# Patient Record
Sex: FEMALE | HISPANIC OR LATINO | URBAN - METROPOLITAN AREA
[De-identification: names, ages, dates, MRNs, and addresses within clinical notes are randomized per-mention and may not be internally consistent; named-entity substitution may affect disease eponyms.]

---

## 2017-05-11 ENCOUNTER — OFFICE VISIT (OUTPATIENT)
Dept: OPHTHALMOLOGY | Facility: CLINIC | Age: 62
End: 2017-05-11
Attending: OPHTHALMOLOGY
Payer: MEDICAID

## 2017-05-11 DIAGNOSIS — Z96.1 PSEUDOPHAKIA: Primary | ICD-10-CM

## 2017-05-11 DIAGNOSIS — H04.123 TEAR FILM INSUFFICIENCY, BILATERAL: ICD-10-CM

## 2017-05-11 DIAGNOSIS — H10.45 OTHER CHRONIC ALLERGIC CONJUNCTIVITIS: ICD-10-CM

## 2017-05-11 PROCEDURE — 92015 DETERMINE REFRACTIVE STATE: CPT | Mod: ZF

## 2017-05-11 PROCEDURE — 99213 OFFICE O/P EST LOW 20 MIN: CPT | Mod: ZF

## 2017-05-11 PROCEDURE — T1013 SIGN LANG/ORAL INTERPRETER: HCPCS | Mod: U3,ZF | Performed by: OPHTHALMOLOGY

## 2017-05-11 RX ORDER — OLOPATADINE HYDROCHLORIDE 1 MG/ML
1 SOLUTION/ DROPS OPHTHALMIC 2 TIMES DAILY
Qty: 1 BOTTLE | Refills: 11 | Status: SHIPPED | OUTPATIENT
Start: 2017-05-11 | End: 2018-11-12

## 2017-05-11 RX ORDER — CARBOXYMETHYLCELLULOSE SODIUM 5 MG/ML
1 SOLUTION/ DROPS OPHTHALMIC 4 TIMES DAILY
Qty: 1 EACH | Refills: 11 | Status: SHIPPED | OUTPATIENT
Start: 2017-05-11 | End: 2021-10-19

## 2017-05-11 ASSESSMENT — CUP TO DISC RATIO
OS_RATIO: 0.3
OD_RATIO: 0.3

## 2017-05-11 ASSESSMENT — VISUAL ACUITY
METHOD: SNELLEN - LINEAR
OS_PH_SC: 20/60-1
OD_SC: 20/25+2
OS_SC: 20/100

## 2017-05-11 ASSESSMENT — REFRACTION_MANIFEST
OD_CYLINDER: +0.75
OS_AXIS: 015
OS_ADD: +2.50
OS_SPHERE: -2.00
OD_AXIS: 105
OD_ADD: +2.50
OS_CYLINDER: +1.00
OD_SPHERE: -0.75

## 2017-05-11 ASSESSMENT — CONF VISUAL FIELD
METHOD: COUNTING FINGERS
OD_NORMAL: 1
OS_NORMAL: 1

## 2017-05-11 ASSESSMENT — TONOMETRY
OD_IOP_MMHG: 18
OS_IOP_MMHG: 15
IOP_METHOD: TONOPEN

## 2017-05-11 NOTE — PROGRESS NOTES
Jody Cooper is a 61 year old female presenting for follow up pseudophakia OU.  High myopia  S/p laser in situ keratomileusis (LASIK). Diabetes mellitus s Diabetic retinopathy  Ocular allergy uses patanol    Vision has been stable    Occasional itching    Eye History:  Pseudophakia both eyes 2014 (LE 11/12/2014, RE 10/2014)  S/p YAG left eye   S/P laser in situ keratomileusis (LASIK) both eyes     Eye Meds:  PFATs  Patanol     1) Pseudophakia, both eyes  - s/p yag left eye - doing well    2) H/o laser in situ keratomileusis (LASIK)  - stable both eyes  - tears as needed    3) h/o amblyopia LE    5) Refractive Error:  - manifest refraction updated    6) Diabetes mellitus with background Diabetic retinopathy - follows Koozluis felipe Mayen patanol  Preservative free artificial tears  Return to clinic 2 yr     ~~~~~~~~~~~~~~~~~~~~~~~~~~~~~~~~~~~~~~~~~~~~~~~~~~~~~~~~~~~~~~~~    Complete documentation of historical and exam elements from today's encounter can be found in the full encounter summary report (not reduplicated in this progress note). I personally obtained the chief complaint(s) and history of present illness.  I confirmed and edited as necessary the review of systems, past medical/surgical history, family history, social history, and examination findings as documented by others.  I examined the patient myself, and I personally reviewed the relevant tests, images, and reports as documented above. I formulated and edited as necessary the assessment and plan and discussed the findings and management plan with the patient and family.     Moy Taylor MD, MA  Director, Cornea & Anterior Segment  [facility] Department of Ophthalmology & Visual Neuroscience

## 2017-05-11 NOTE — NURSING NOTE
Chief Complaints and History of Present Illnesses   Patient presents with     Pseudophakia Follow Up     BE     HPI    Affected eye(s):  Both   Symptoms:     No decreased vision   Foreign body sensation   Dryness         Do you have eye pain now?:  No      Comments:  VA seems good BE, no changes noticed. Occ irritation BE. Using AT and patanol qd BE.     Jane RICE May 11, 2017 9:24 AM

## 2017-09-17 ENCOUNTER — HEALTH MAINTENANCE LETTER (OUTPATIENT)
Age: 62
End: 2017-09-17

## 2017-09-29 ENCOUNTER — OFFICE VISIT (OUTPATIENT)
Dept: OPHTHALMOLOGY | Facility: CLINIC | Age: 62
End: 2017-09-29
Attending: OPHTHALMOLOGY
Payer: MEDICAID

## 2017-09-29 DIAGNOSIS — E10.9 TYPE 1 DIABETES MELLITUS WITHOUT COMPLICATION (H): Primary | ICD-10-CM

## 2017-09-29 DIAGNOSIS — H10.45 OTHER CHRONIC ALLERGIC CONJUNCTIVITIS OF BOTH EYES: ICD-10-CM

## 2017-09-29 LAB
CRP SERPL-MCNC: 3.2 MG/L (ref 0–8)
ERYTHROCYTE [DISTWIDTH] IN BLOOD BY AUTOMATED COUNT: 12.3 % (ref 10–15)
ERYTHROCYTE [SEDIMENTATION RATE] IN BLOOD BY WESTERGREN METHOD: 13 MM/H (ref 0–30)
HCT VFR BLD AUTO: 41.2 % (ref 35–47)
HGB BLD-MCNC: 13.7 G/DL (ref 11.7–15.7)
MCH RBC QN AUTO: 30.9 PG (ref 26.5–33)
MCHC RBC AUTO-ENTMCNC: 33.3 G/DL (ref 31.5–36.5)
MCV RBC AUTO: 93 FL (ref 78–100)
PLATELET # BLD AUTO: 191 10E9/L (ref 150–450)
RBC # BLD AUTO: 4.44 10E12/L (ref 3.8–5.2)
WBC # BLD AUTO: 8.1 10E9/L (ref 4–11)

## 2017-09-29 PROCEDURE — 36415 COLL VENOUS BLD VENIPUNCTURE: CPT | Performed by: OPHTHALMOLOGY

## 2017-09-29 PROCEDURE — 85027 COMPLETE CBC AUTOMATED: CPT | Performed by: OPHTHALMOLOGY

## 2017-09-29 PROCEDURE — 99213 OFFICE O/P EST LOW 20 MIN: CPT | Mod: ZF

## 2017-09-29 PROCEDURE — 86140 C-REACTIVE PROTEIN: CPT | Performed by: OPHTHALMOLOGY

## 2017-09-29 PROCEDURE — 92134 CPTRZ OPH DX IMG PST SGM RTA: CPT | Mod: ZF | Performed by: OPHTHALMOLOGY

## 2017-09-29 PROCEDURE — 85652 RBC SED RATE AUTOMATED: CPT | Performed by: OPHTHALMOLOGY

## 2017-09-29 PROCEDURE — T1013 SIGN LANG/ORAL INTERPRETER: HCPCS | Mod: U3,ZF

## 2017-09-29 RX ORDER — DIPHENHYDRAMINE HCL 25 MG
1 CAPSULE ORAL 4 TIMES DAILY PRN
Qty: 15 ML | Refills: 11 | Status: SHIPPED | OUTPATIENT
Start: 2017-09-29 | End: 2018-11-12

## 2017-09-29 RX ORDER — OLOPATADINE HYDROCHLORIDE 1 MG/ML
1 SOLUTION/ DROPS OPHTHALMIC 2 TIMES DAILY
Qty: 1 BOTTLE | Refills: 11 | Status: SHIPPED | OUTPATIENT
Start: 2017-09-29 | End: 2018-09-13

## 2017-09-29 ASSESSMENT — REFRACTION_WEARINGRX
OD_AXIS: 105
OS_CYLINDER: +1.00
OD_SPHERE: -0.75
OD_CYLINDER: +0.75
OS_AXIS: 015
OS_SPHERE: -2.00

## 2017-09-29 ASSESSMENT — VISUAL ACUITY
CORRECTION_TYPE: GLASSES
METHOD: SNELLEN - LINEAR
OS_PH_SC: 20/70
OS_PH_CC: 20/80
OS_CC: 20/100
OD_CC: 20/25

## 2017-09-29 ASSESSMENT — TONOMETRY
OD_IOP_MMHG: 19
OS_IOP_MMHG: 19
IOP_METHOD: TONOPEN

## 2017-09-29 ASSESSMENT — CUP TO DISC RATIO
OS_RATIO: 0.3
OD_RATIO: 0.3

## 2017-09-29 ASSESSMENT — CONF VISUAL FIELD
OD_NORMAL: 1
OS_NORMAL: 1
METHOD: COUNTING FINGERS

## 2017-09-29 NOTE — PROGRESS NOTES
CC - Diabetic eye exam    INTERVAL HISTORY -    no change in vision, does not like her new pair of glasses.  Has HA on LHS & occiput of head past few months, no temporal tenderness, no unintentional weight loss or fevers, some jaw pain with chewing food  Good TA pulses.    HPI -   Jody Cooper is a 60-year-old woman with h/o of diabetes and lattice.  Also h/o amblyopia OS    Diabetes mellitus II, diet controlled  Sees Dr. Carmela Lemus for DM    PAST OCULAR SURGERY  LASIK OU  ~ 2000  laser retinopexy left eye 10/2009  YAG OS 2015    RETINAL IMAGING  OCT mac 9-29-17  Right Eye  FD+, no subretinal fluid / intra-retinal fluid   Left Eye   FD+, no subretinal fluid / intra-retinal fluid , peripapillary atrophy    ASSESSMENT & PLAN  1. DMII since ~ 2013   -  Good BG control, No Diabetic retinopathy   - Continue good blood sugar/BP control   - Follow up 1 year    2. Lattice degeneration OU   - left eye s/p laser retinopexy for atrophic holes 2009   -Retinal detachment  Precautions discussed    3. Amblyopia left eye    -Observe    4. S/p laser in situ keratomileusis (LASIK) both eyes     5. ALETHEA and allergic Sx   - artificial tears and patanol Rx refilled   - may be causing the HA she has    6. Pseudophakia both eyes    - unhappy with her current bifocals, her refraction is correct,    - appears to be related to location of adds, recommended to see an  to make adjustment    7.  HA    - GCA labs OK, not likely GCA      Follow up 1 year with OCT Mac     Logan Franz MD, PhD  Vitreoretinal Surgery Fellow    ATTESTATION     Attending Physician Attestation:      Complete documentation of historical and exam elements from today's encounter can be found in the full encounter summary report (not reduplicated in this progress note).  I personally obtained the chief complaint(s) and history of present illness.  I confirmed and edited as necessary the review of systems, past medical/surgical history, family  history, social history, and examination findings as documented by others; and I examined the patient myself.  I personally reviewed the relevant tests, images, and reports as documented above.  I personally reviewed the ophthalmic test(s) associated with this encounter, agree with the interpretation(s) as documented by the resident/fellow, and have edited the corresponding report(s) as necessary.   I formulated and edited as necessary the assessment and plan and discussed the findings and management plan with the patient and family    Odessa De Jesus MD, PhD  , Vitreoretinal Surgery  Department of Ophthalmology  [facility]

## 2017-09-29 NOTE — MR AVS SNAPSHOT
After Visit Summary   2017    Jody Cooper    MRN: [id number]           Patient Information     Date Of Birth          1955        Visit Information        Provider Department      2017 10:15 AM Lindsay Palmer; Odessa De Jesus MD [facility]        Today's Diagnoses     Type 1 diabetes mellitus without complication (H)    -  1    Other chronic allergic conjunctivitis of both eyes           Follow-ups after your visit        Follow-up notes from your care team     Return in about 1 year (around 2018) for DFE, OCT Mac.      Future tests that were ordered for you today     Open Future Orders        Priority Expected Expires Ordered    OCT Retina Spectralis OU (both eyes) Routine  2019            Who to contact     Please call your clinic at [phone] to:    Ask questions about your health    Make or cancel appointments    Discuss your medicines    Learn about your test results    Speak to your doctor   If you have compliments or concerns about an experience at your clinic, or if you wish to file a complaint, please contact [facility] Patient Relations at [phone] or email us at [email or web address]         Additional Information About Your Visit        MyChart Information     [employer]D [employer]omat[employer] is an electronic gateway that provides easy, online access to your medical records. With COLOURlovers, you can request a clinic appointment, read your test results, renew a prescription or communicate with your care team.     To sign up for Pixtrt visit the website at [email or web address]   You will be asked to enter the access code listed below, as well as some personal information. Please follow the directions to create your username and password.     Your access code is: [email or web address]  Expires: 2017  6:30 AM     Your access code will  in 90 days. If you need help or a new code, please contact your  [facility] or call [phone] for assistance.        Care EveryWhere ID     This is your Care EveryWhere ID. This could be used by other organizations to access your [facility] medical records  [id number]         Blood Pressure from Last 3 Encounters:   No data found for BP    Weight from Last 3 Encounters:   No data found for Wt              We Performed the Following     CBC with platelets     CRP inflammation     Erythrocyte sedimentation rate auto     OCT Retina Spectralis OU (both eyes)          Today's Medication Changes          These changes are accurate as of: 9/29/17 12:36 PM.  If you have any questions, ask your nurse or doctor.               Start taking these medicines.        Dose/Directions    hypromellose-dextran 0.3-0.1% opthalmic solution   Used for:  Other chronic allergic conjunctivitis of both eyes   Started by:  Odessa De Jesus MD        Dose:  1 drop   Place 1 drop into both eyes 4 times daily as needed   Quantity:  15 mL   Refills:  11         These medicines have changed or have updated prescriptions.        Dose/Directions    * olopatadine 0.1 % ophthalmic solution   Commonly known as:  PATANOL   This may have changed:  Another medication with the same name was added. Make sure you understand how and when to take each.   Used for:  Other chronic allergic conjunctivitis   Changed by:  Moy Taylor MD        Dose:  1 drop   Place 1 drop into both eyes 2 times daily   Quantity:  1 Bottle   Refills:  11       * olopatadine 0.1 % ophthalmic solution   Commonly known as:  PATANOL   This may have changed:  You were already taking a medication with the same name, and this prescription was added. Make sure you understand how and when to take each.   Used for:  Other chronic allergic conjunctivitis of both eyes   Changed by:  Odessa De Jesus MD        Dose:  1 drop   Place 1 drop into both eyes 2 times daily   Quantity:  1 Bottle   Refills:   11       * Notice:  This list has 2 medication(s) that are the same as other medications prescribed for you. Read the directions carefully, and ask your doctor or other care provider to review them with you.         Where to get your medicines      These medications were sent to [id number] - [place], [place] - [address] AT [facility] [address], [facility] [address]    Hours:  24-hours Phone:  [phone]     hypromellose-dextran 0.3-0.1% opthalmic solution    olopatadine 0.1 % ophthalmic solution                Primary Care Provider Office Phone # Fax #    Maria Teresa Nguyen [phone]       [place] [facility] [address] [place] [address]        Equal Access to Services     BRO MALCOLM : Hadii aad ku hadasho Socarrol, waaxda luqadaha, qaybta kaalmada adeabelyaml, lu woodard. So [facility] [phone].    ATENCIÓN: Si habla español, tiene a vergara disposición servicios gratuitos de asistencia lingüística. Adriana al [phone].    We comply with applicable federal civil rights laws and [place] laws. We do not discriminate on the basis of race, color, national origin, age, disability, sex, sexual orientation, or gender identity.            Thank you!     Thank you for choosing EYE CLINIC  for your care. Our goal is always to provide you with excellent care. Hearing back from our patients is one way we can continue to improve our services. Please take a few minutes to complete the written survey that you may receive in the mail after your visit with us. Thank you!             Your Updated Medication List - Protect others around you: Learn how to safely use, store and throw away your medicines at www.disposemymeds.org.          This list is accurate as of: 9/29/17 12:36 PM.  Always use your most recent med list.                   Brand Name Dispense Instructions for use Diagnosis    aspirin 81 MG tablet      30 tablet    Take 1 tablet (81 mg) by mouth daily    Scleritis, unspecified       bisacodyl 5 MG EC tablet    DULCOLAX     Take 5 mg by mouth daily as needed        blood glucose lancets standard    no brand specified          blood glucose lancing device    no brand specified          carboxymethylcellulose 0.5 % Soln ophthalmic solution    carboxymethylcellulose sodium    1 each    Place 1 drop into both eyes 4 times daily    Pseudophakia, Tear film insufficiency, bilateral       ciclopirox 8 % Soln           COLYTE PO           FLEXERIL PO           hypromellose-dextran 0.3-0.1% opthalmic solution     15 mL    Place 1 drop into both eyes 4 times daily as needed    Other chronic allergic conjunctivitis of both eyes       lisinopril 2.5 MG tablet    PRINIVIL/Zestril     Take 2.5 mg by mouth daily        MYLANTA PO      Take by mouth every 6 hours as needed        nexIUM 40 MG Pack   Generic drug:  Esomeprazole Magnesium           nortriptyline 10 MG capsule    PAMELOR          * olopatadine 0.1 % ophthalmic solution    PATANOL    1 Bottle    Place 1 drop into both eyes 2 times daily    Other chronic allergic conjunctivitis       * olopatadine 0.1 % ophthalmic solution    PATANOL    1 Bottle    Place 1 drop into both eyes 2 times daily    Other chronic allergic conjunctivitis of both eyes       polyethylene glycol powder    MIRALAX/GLYCOLAX     Take 1 capful by mouth daily        simvastatin 20 MG tablet    ZOCOR     Take by mouth At Bedtime        traZODone 50 MG tablet    DESYREL     Take by mouth At Bedtime        VITAMIN D3 PO      Take by mouth daily        * Notice:  This list has 2 medication(s) that are the same as other medications prescribed for you. Read the directions carefully, and ask your doctor or other care provider to review them with you.

## 2017-09-29 NOTE — NURSING NOTE
Chief Complaints and History of Present Illnesses   Patient presents with     Diabetic Eye Exam      Lattice degeneration OU     HPI    Affected eye(s):  Both   Symptoms:     No blurred vision   No decreased vision   Redness   Dryness         Do you have eye pain now?:  Yes   Location:  OS   Pain Level:  Mild Pain (3), Mild Pain (2)      Comments:  Having a harder time reading.  Not happy with her new glasses. Positioning of the bifocal seems low.  Pt complains of eyes hurting. Has to use sunglasses all the time. The whole eye hurts OS. Feels like things are swollen. Dryness, pressure. Red at night. Pain is not bad now but starts later in the day and moves from the front of the eye all the way to the back of the head.  Pt states blood sugars have been under control.  Last A1C was 7.6  Mignon Holden COA 10:36 AM September 29, 2017

## 2018-11-12 ENCOUNTER — OFFICE VISIT (OUTPATIENT)
Dept: OPHTHALMOLOGY | Facility: CLINIC | Age: 63
End: 2018-11-12
Attending: OPHTHALMOLOGY
Payer: MEDICAID

## 2018-11-12 DIAGNOSIS — H43.812 VITREOUS DEGENERATION AND DETACHMENT OF LEFT EYE: ICD-10-CM

## 2018-11-12 DIAGNOSIS — H35.413 BILATERAL RETINAL LATTICE DEGENERATION: Primary | ICD-10-CM

## 2018-11-12 DIAGNOSIS — H10.45 OTHER CHRONIC ALLERGIC CONJUNCTIVITIS OF BOTH EYES: ICD-10-CM

## 2018-11-12 DIAGNOSIS — E10.9 TYPE 1 DIABETES MELLITUS WITHOUT COMPLICATION (H): ICD-10-CM

## 2018-11-12 PROCEDURE — 92134 CPTRZ OPH DX IMG PST SGM RTA: CPT | Mod: ZF | Performed by: OPHTHALMOLOGY

## 2018-11-12 PROCEDURE — 92015 DETERMINE REFRACTIVE STATE: CPT | Mod: ZF

## 2018-11-12 PROCEDURE — G0463 HOSPITAL OUTPT CLINIC VISIT: HCPCS | Mod: 25,ZF

## 2018-11-12 PROCEDURE — T1013 SIGN LANG/ORAL INTERPRETER: HCPCS | Mod: U3,ZP | Performed by: OPHTHALMOLOGY

## 2018-11-12 RX ORDER — OLOPATADINE HYDROCHLORIDE 1 MG/ML
1 SOLUTION/ DROPS OPHTHALMIC 2 TIMES DAILY
Qty: 1 BOTTLE | Refills: 11 | Status: SHIPPED | OUTPATIENT
Start: 2018-11-12 | End: 2019-09-17

## 2018-11-12 ASSESSMENT — REFRACTION_MANIFEST
OS_ADD: +2.50
OD_SPHERE: -0.25
OD_ADD: +2.50
OS_AXIS: 048
OS_SPHERE: -1.75
OD_CYLINDER: SPHERE
OS_CYLINDER: +1.75

## 2018-11-12 ASSESSMENT — VISUAL ACUITY
OS_CC: 20/80
OD_CC: 20/20
METHOD: SNELLEN - LINEAR
OD_CC+: -2

## 2018-11-12 ASSESSMENT — TONOMETRY
IOP_METHOD: TONOPEN
OD_IOP_MMHG: 17
OS_IOP_MMHG: 18

## 2018-11-12 ASSESSMENT — REFRACTION_WEARINGRX
OD_SPHERE: -0.50
OS_AXIS: 015
OS_CYLINDER: +1.50
OD_AXIS: 105
OS_ADD: +2.50
OD_CYLINDER: +0.75
OD_ADD: +2.50
OS_SPHERE: -2.00

## 2018-11-12 ASSESSMENT — CUP TO DISC RATIO
OS_RATIO: 0.3
OD_RATIO: 0.3

## 2018-11-12 ASSESSMENT — CONF VISUAL FIELD
OS_NORMAL: 1
OD_NORMAL: 1
METHOD: COUNTING FINGERS

## 2018-11-12 NOTE — PATIENT INSTRUCTIONS
Future Appointments  Date Time Provider Department Center   9/17/2019 9:40 AM Odessa De Jesus MD [facility] CLIN

## 2018-11-12 NOTE — NURSING NOTE
Chief Complaints and History of Present Illnesses   Patient presents with     Follow Up For     Type 1 diabetes mellitus without complication (H)     HPI    Last Eye Exam:  9/29/17   Affected eye(s):  Both   Symptoms:        Unknown duration    Frequency:  Constant       Do you have eye pain now?:  No      Comments:  Jody is here for a follow up of Type 1 diabetes mellitus without complication (H)  She says her eyes are dry and itches. She says when she used Pataday, that helped the itch. She   Would like a prescription for Pataday and Refresh. She can use her medical for the Refresh if it is a prescription.   She would like a new glasses Rx too.    No A1C  BS today was 137    Price Talavera COT 10:21 AM November 12, 2018

## 2018-11-12 NOTE — PROGRESS NOTES
CC - Diabetic eye exam    INTERVAL HISTORY -   VA stable, last A1c 6.6 ~8/2018.    HPI -   Jody Cooper is a 63-year-old woman with h/o of diabetes and lattice.  Also h/o amblyopia OS    Diabetes mellitus II, diet controlled  Sees Dr. Carmela Lemus for DM    PAST OCULAR SURGERY  LASIK OU  ~ 2000  laser retinopexy left eye 10/2009  YAG OS 2015    RETINAL IMAGING  OCT mac 11-12-18  OD - retina normal, PVD, stable  OS - mild outer atrophy,   peripapillary atrophy    ASSESSMENT & PLAN  1. DMII since ~ 2013   -  Good BG control, No Diabetic retinopathy   - Continue good blood sugar/BP control   - Follow up 1 year    2. Lattice degeneration OU   - left eye s/p laser retinopexy for atrophic holes 2009   -Retinal detachment  Precautions discussed    3. Amblyopia left eye    -Observe    4. S/p laser in situ keratomileusis (LASIK) both eyes     5. ALETHEA and allergic Sx   - artificial tears and patanol Rx refilled        7.  HA    - GCA labs OK, not likely GCA      Follow up 1 year with OCT Mac     ATTESTATION     Attending Physician Attestation:      Complete documentation of historical and exam elements from today's encounter can be found in the full encounter summary report (not reduplicated in this progress note).  I personally obtained the chief complaint(s) and history of present illness.  I confirmed and edited as necessary the review of systems, past medical/surgical history, family history, social history, and examination findings as documented by others; and I examined the patient myself.  I personally reviewed the relevant tests, images, and reports as documented above.  I formulated and edited as necessary the assessment and plan and discussed the findings and management plan with the patient and family    Odessa De Jesus MD, PhD  , Vitreoretinal Surgery  Department of Ophthalmology  [facility]

## 2018-11-12 NOTE — MR AVS SNAPSHOT
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      Your next 10 appointments already scheduled     Sep 17, 2019  9:40 AM CDT   RETURN RETINA with Odessa De Jesus MD   Eye Clinic ([facility])    [address]  [facility] [id number] [phone]              Who to contact     Please call your clinic at [phone] to:    Ask questions about your health    Make or cancel appointments    Discuss your medicines    Learn about your test results    Speak to your doctor            Additional Information About Your Visit        MyChart Information     WhenSoont is an electronic gateway that provides easy, online access to your medical records. With [employer], you can request a clinic appointment, read your test results, renew a prescription or communicate with your care team.     To sign up for WhenSoont visit the website at [email or web address]   You will be asked to enter the access code listed below, as well as some personal information. Please follow the directions to create your username and password.     Your access code is: [email or web address]  Expires: 1/27/2019  5:31 AM     Your  access code will  in 90 days. If you need help or a new code, please contact your [facility] or call [phone] for assistance.        Care EveryWhere ID     This is your Care EveryWhere ID. This could be used by other organizations to access your [facility] medical records  [id number]         Blood Pressure from Last 3 Encounters:   No data found for BP    Weight from Last 3 Encounters:   No data found for Wt              We Performed the Following     OCT Retina Spectralis OU (both eyes)          Today's Medication Changes          These changes are accurate as of 18 12:20 PM.  If you have any questions, ask your nurse or doctor.               These medicines have changed or have updated prescriptions.        Dose/Directions    hypromellose-dextran 0.1-0.3 % Soln ophthalmic solution   Commonly known as:  hypromellose-dextran 0.3-0.1%   This may have changed:  reasons to take this   Used for:  Other chronic allergic conjunctivitis of both eyes        Dose:  1 drop   Place 1 drop into both eyes 4 times daily as needed for dry eyes   Quantity:  15 mL   Refills:  11            Where to get your medicines      These medications were sent to [facility] [id number] [place] [address] [address] AT [facility] [place] &   [address], [facility] [address]     Phone:  [phone]     hypromellose-dextran 0.1-0.3 % Soln ophthalmic solution    olopatadine 0.1 % ophthalmic solution                Primary Care Provider Office Phone # Fax #    Maria Teresa Nguyen [phone]       [place] [facility]ILY [facility] [address] [place] [address]        Equal Access to Services     [facility]: Hadii aad ku hadasho Soomaali, waaxda luqadaha, qaybta kaalmada adeabelyaml, lu woodard. So [facility] [phone].    ATENCIÓN: Si habla español, tiene a vergara disposición servicios gratuitos de asistencia lingüística.  Adriana cole [phone].    We comply with applicable federal civil rights laws and [place] laws. We do not discriminate on the basis of race, color, national origin, age, disability, sex, sexual orientation, or gender identity.            Thank you!     Thank you for choosing EYE CLINIC  for your care. Our goal is always to provide you with excellent care. Hearing back from our patients is one way we can continue to improve our services. Please take a few minutes to complete the written survey that you may receive in the mail after your visit with us. Thank you!             Your Updated Medication List - Protect others around you: Learn how to safely use, store and throw away your medicines at www.disposemymeds.org.          This list is accurate as of 11/12/18 12:20 PM.  Always use your most recent med list.                   Brand Name Dispense Instructions for use Diagnosis    aspirin 81 MG tablet     30 tablet    Take 1 tablet (81 mg) by mouth daily    Scleritis, unspecified       bisacodyl 5 MG EC tablet    DULCOLAX     Take 5 mg by mouth daily as needed        blood glucose lancets standard    no brand specified          blood glucose lancing device    no brand specified          carboxymethylcellulose 0.5 % Soln ophthalmic solution    carboxymethylcellulose sodium    1 each    Place 1 drop into both eyes 4 times daily    Pseudophakia, Tear film insufficiency, bilateral       ciclopirox 8 % Soln           COLYTE PO           FLEXERIL PO           hypromellose-dextran 0.1-0.3 % Soln ophthalmic solution    hypromellose-dextran 0.3-0.1%    15 mL    Place 1 drop into both eyes 4 times daily as needed for dry eyes    Other chronic allergic conjunctivitis of both eyes       lisinopril 2.5 MG tablet    PRINIVIL/Zestril     Take 2.5 mg by mouth daily        MYLANTA PO      Take by mouth every 6 hours as needed        nexIUM 40 MG Pack   Generic drug:  Esomeprazole Magnesium           nortriptyline 10 MG capsule     PAMELOR          * olopatadine 0.1 % ophthalmic solution    PATANOL    5 mL    INSTILL 1 DROP INTO BOTH EYES TWICE DAILY    Other chronic allergic conjunctivitis of both eyes       * olopatadine 0.1 % ophthalmic solution    PATANOL    1 Bottle    Place 1 drop into both eyes 2 times daily    Other chronic allergic conjunctivitis of both eyes       polyethylene glycol powder    MIRALAX/GLYCOLAX     Take 1 capful by mouth daily        simvastatin 20 MG tablet    ZOCOR     Take by mouth At Bedtime        traZODone 50 MG tablet    DESYREL     Take by mouth At Bedtime        VITAMIN D3 PO      Take by mouth daily        * Notice:  This list has 2 medication(s) that are the same as other medications prescribed for you. Read the directions carefully, and ask your doctor or other care provider to review them with you.

## 2019-09-16 DIAGNOSIS — H35.54 RPE (RETINAL PIGMENT EPITHELIUM) ATROPHY: Primary | ICD-10-CM

## 2019-09-17 ENCOUNTER — OFFICE VISIT (OUTPATIENT)
Dept: OPHTHALMOLOGY | Facility: CLINIC | Age: 64
End: 2019-09-17
Attending: OPHTHALMOLOGY
Payer: MEDICAID

## 2019-09-17 DIAGNOSIS — E10.9 TYPE 1 DIABETES MELLITUS WITHOUT COMPLICATION (H): ICD-10-CM

## 2019-09-17 DIAGNOSIS — H43.812 VITREOUS DEGENERATION AND DETACHMENT OF LEFT EYE: ICD-10-CM

## 2019-09-17 DIAGNOSIS — H35.54 RPE (RETINAL PIGMENT EPITHELIUM) ATROPHY: ICD-10-CM

## 2019-09-17 DIAGNOSIS — H10.45 OTHER CHRONIC ALLERGIC CONJUNCTIVITIS OF BOTH EYES: ICD-10-CM

## 2019-09-17 DIAGNOSIS — H35.413 BILATERAL RETINAL LATTICE DEGENERATION: Primary | ICD-10-CM

## 2019-09-17 PROCEDURE — 92134 CPTRZ OPH DX IMG PST SGM RTA: CPT | Mod: ZF | Performed by: OPHTHALMOLOGY

## 2019-09-17 PROCEDURE — G0463 HOSPITAL OUTPT CLINIC VISIT: HCPCS | Mod: ZF

## 2019-09-17 PROCEDURE — T1013 SIGN LANG/ORAL INTERPRETER: HCPCS | Mod: U3,ZF | Performed by: OPHTHALMOLOGY

## 2019-09-17 RX ORDER — OLOPATADINE HYDROCHLORIDE 1 MG/ML
1 SOLUTION/ DROPS OPHTHALMIC 2 TIMES DAILY
Qty: 1 BOTTLE | Refills: 11 | Status: SHIPPED | OUTPATIENT
Start: 2019-09-17 | End: 2020-09-25

## 2019-09-17 ASSESSMENT — CONF VISUAL FIELD
OS_NORMAL: 1
OD_NORMAL: 1
METHOD: COUNTING FINGERS

## 2019-09-17 ASSESSMENT — VISUAL ACUITY
CORRECTION_TYPE: GLASSES
OD_CC: 20/20
OS_CC: 20/60
METHOD: SNELLEN - LINEAR

## 2019-09-17 ASSESSMENT — CUP TO DISC RATIO
OS_RATIO: 0.3
OD_RATIO: 0.3

## 2019-09-17 ASSESSMENT — REFRACTION_WEARINGRX
OD_SPHERE: -0.50
OS_ADD: +2.50
OD_CYLINDER: +0.75
OD_ADD: +2.50
OS_CYLINDER: +1.50
OS_AXIS: 015
OD_AXIS: 105
OS_SPHERE: -2.00

## 2019-09-17 ASSESSMENT — TONOMETRY
IOP_METHOD: TONOPEN
OD_IOP_MMHG: 18
OS_IOP_MMHG: 17

## 2019-09-17 ASSESSMENT — EXTERNAL EXAM - LEFT EYE: OS_EXAM: NORMAL

## 2019-09-17 ASSESSMENT — EXTERNAL EXAM - RIGHT EYE: OD_EXAM: NORMAL

## 2019-09-17 NOTE — PATIENT INSTRUCTIONS
-----------------------------------------  DRY EYE INSTRUCTIONS    You have dry eyes.   Artificial tears may be helpful.  Please see the list of brands below.  You may use preserved artifical tears (in a muti-use bottle) 2-4 times per day.  Do not use preserved tears more than 4 times per day.  If you wish to use artifical tears more than 4 times per day, you should use preservative-free artifical tears.  These come in single-use vials.  You can open a new vial each day to use throughout the day, but it should be discarded at the end of the day.  Thicker tears, gels, and ointments are more effective, but they may blur your vision.  Some patients prefer to use those only at bedtime.    You may also benefit from washing your eyelashes (not your eye lids) gently using your finger once or twice per day with tap water.    You may also benefit from warm compresses once or twice per day to your eyelids.  Use a clean washcloth soaked in warm water, and place it over your eyes for 5 minutes.    Avoid medicated drops for red eyes which contain vasoconstrictors.  These should not be used for more than a few days in a row, as the medication can cause serious problems if used for too many days in a row.      Example Recommended Artificial Tear Brands:    Dry Eye Drops    Optive  Systane Ultra  TheraTears  Genteal  Refresh  Blink Tears  Soothe      Gels    Refresh Liquigel  Genteal Gel  TheraTears Gel  Celluvisc  Blink Gel  Systane Gel      Ointments    Refresh PM  Lacrilube      Contact Lens Compatible    Blink Contacts  Blink Tears  Refresh Contacts  Systane Ultra  Complete Lubricating and Rewetting  Complete Blink and Clean Lens Drops

## 2019-09-17 NOTE — PROGRESS NOTES
CC - Diabetic eye exam    INTERVAL HISTORY -   VA stable, last A1c 6.7 ~8/2019.  Has ALETHEA using artificial tears 2/day    HPI -   Jody Cooper is a 63-year-old woman with h/o of diabetes and lattice.  Also h/o amblyopia OS    Diabetes mellitus II, diet controlled  Sees Dr. Carmela Lemus for DM    PAST OCULAR SURGERY  LASIK OU  ~ 2000  laser retinopexy left eye 10/2009  YAG OS 2015    RETINAL IMAGING  OCT mac 9-17-19  OD - retina normal, PVD, stable  OS - mild outer atrophy,   peripapillary atrophy    ASSESSMENT & PLAN  1. DMII since ~ 2013   -  Good BG control, No Diabetic retinopathy   - Continue good blood sugar/BP control   - Follow up 1 year    2. Lattice degeneration OU   - left eye s/p laser retinopexy for atrophic holes 2009   -Retinal detachment  Precautions discussed    3. Amblyopia left eye    -Observe    4. S/p laser in situ keratomileusis (LASIK) both eyes     5. ALETHEA and allergic Sx   - artificial tears and patanol Rx refilled   - d/w patient PFATs        7.  HA 2017   - GCA labs OK 2017, not likely GCA      Follow up 1 year with OCT Mac     ATTESTATION     Attending Physician Attestation:      Complete documentation of historical and exam elements from today's encounter can be found in the full encounter summary report (not reduplicated in this progress note).  I personally obtained the chief complaint(s) and history of present illness.  I confirmed and edited as necessary the review of systems, past medical/surgical history, family history, social history, and examination findings as documented by others; and I examined the patient myself.  I personally reviewed the relevant tests, images, and reports as documented above.  I formulated and edited as necessary the assessment and plan and discussed the findings and management plan with the patient and family    Odessa De Jesus MD, PhD  , Vitreoretinal Surgery  Department of Ophthalmology  [facility]

## 2019-09-17 NOTE — NURSING NOTE
Chief Complaints and History of Present Illnesses   Patient presents with     Follow Up     Chief Complaint(s) and History of Present Illness(es)     Follow Up               Comments     Follow up for  diabetes and lattice degeneration.   Anjali Devlin, COA, COA 9:39 AM 09/17/2019

## 2020-09-25 DIAGNOSIS — H10.45 OTHER CHRONIC ALLERGIC CONJUNCTIVITIS OF BOTH EYES: ICD-10-CM

## 2020-09-25 RX ORDER — OLOPATADINE HYDROCHLORIDE 1 MG/ML
1 SOLUTION/ DROPS OPHTHALMIC 2 TIMES DAILY
Qty: 1 BOTTLE | Refills: 0 | Status: SHIPPED | OUTPATIENT
Start: 2020-09-25 | End: 2020-12-16

## 2020-10-06 DIAGNOSIS — H10.45 OTHER CHRONIC ALLERGIC CONJUNCTIVITIS OF BOTH EYES: ICD-10-CM

## 2020-10-06 RX ORDER — OLOPATADINE HYDROCHLORIDE 1 MG/ML
1 SOLUTION/ DROPS OPHTHALMIC 2 TIMES DAILY
Qty: 1 BOTTLE | Refills: 0 | OUTPATIENT
Start: 2020-10-06

## 2020-10-12 ENCOUNTER — OFFICE VISIT (OUTPATIENT)
Dept: OPHTHALMOLOGY | Facility: CLINIC | Age: 65
End: 2020-10-12
Attending: OPHTHALMOLOGY
Payer: MEDICAID

## 2020-10-12 DIAGNOSIS — H35.54 RPE (RETINAL PIGMENT EPITHELIUM) ATROPHY: ICD-10-CM

## 2020-10-12 DIAGNOSIS — E10.9 TYPE 1 DIABETES MELLITUS WITHOUT COMPLICATION (H): ICD-10-CM

## 2020-10-12 DIAGNOSIS — H35.413 BILATERAL RETINAL LATTICE DEGENERATION: ICD-10-CM

## 2020-10-12 DIAGNOSIS — H43.812 VITREOUS DEGENERATION AND DETACHMENT OF LEFT EYE: ICD-10-CM

## 2020-10-12 PROBLEM — E11.9 CONTROLLED TYPE 2 DIABETES MELLITUS WITHOUT COMPLICATION, WITHOUT LONG-TERM CURRENT USE OF INSULIN (H): Status: ACTIVE | Noted: 2017-09-07

## 2020-10-12 PROBLEM — F33.9 DEPRESSION, MAJOR, RECURRENT (H): Status: ACTIVE | Noted: 2017-09-07

## 2020-10-12 PROBLEM — R10.32 LEFT LOWER QUADRANT PAIN: Status: ACTIVE | Noted: 2019-07-30

## 2020-10-12 PROBLEM — K21.9 GASTROESOPHAGEAL REFLUX DISEASE WITHOUT ESOPHAGITIS: Status: ACTIVE | Noted: 2017-09-07

## 2020-10-12 PROBLEM — G47.33 OBSTRUCTIVE SLEEP APNEA ON CPAP: Status: ACTIVE | Noted: 2019-05-20

## 2020-10-12 PROCEDURE — T1013 SIGN LANG/ORAL INTERPRETER: HCPCS | Mod: U4

## 2020-10-12 PROCEDURE — 92134 CPTRZ OPH DX IMG PST SGM RTA: CPT | Performed by: OPHTHALMOLOGY

## 2020-10-12 PROCEDURE — T1013 SIGN LANG/ORAL INTERPRETER: HCPCS | Mod: U3

## 2020-10-12 PROCEDURE — 99213 OFFICE O/P EST LOW 20 MIN: CPT | Performed by: OPHTHALMOLOGY

## 2020-10-12 PROCEDURE — 92015 DETERMINE REFRACTIVE STATE: CPT

## 2020-10-12 PROCEDURE — G0463 HOSPITAL OUTPT CLINIC VISIT: HCPCS

## 2020-10-12 RX ORDER — PANTOPRAZOLE SODIUM 40 MG/1
40 TABLET, DELAYED RELEASE ORAL
COMMUNITY
Start: 2020-03-12 | End: 2021-03-12

## 2020-10-12 RX ORDER — MELOXICAM 7.5 MG/1
TABLET ORAL
COMMUNITY
Start: 2020-09-21

## 2020-10-12 RX ORDER — POLYVINYL ALCOHOL 14 MG/ML
SOLUTION/ DROPS OPHTHALMIC
COMMUNITY
Start: 2020-08-17

## 2020-10-12 RX ORDER — GLIMEPIRIDE 1 MG/1
1 TABLET ORAL
COMMUNITY
Start: 2020-06-05

## 2020-10-12 ASSESSMENT — VISUAL ACUITY
METHOD: SNELLEN - LINEAR
OS_CC: 20/80
CORRECTION_TYPE: GLASSES
OD_CC: 20/25

## 2020-10-12 ASSESSMENT — REFRACTION_WEARINGRX
OD_CYLINDER: +0.75
OD_ADD: +2.50
OS_SPHERE: -2.00
OS_CYLINDER: +1.50
OS_AXIS: 015
OS_ADD: +2.50
OD_AXIS: 105
OD_SPHERE: -0.50

## 2020-10-12 ASSESSMENT — TONOMETRY
OS_IOP_MMHG: 18
OD_IOP_MMHG: 17
IOP_METHOD: TONOPEN

## 2020-10-12 ASSESSMENT — REFRACTION_MANIFEST
OD_CYLINDER: +0.25
OD_AXIS: 120
OS_SPHERE: -3.50
OS_AXIS: 035
OD_SPHERE: -0.75
OS_CYLINDER: +1.50

## 2020-10-12 ASSESSMENT — CUP TO DISC RATIO
OS_RATIO: 0.2
OD_RATIO: 0.2

## 2020-10-12 ASSESSMENT — CONF VISUAL FIELD
OS_NORMAL: 1
OD_NORMAL: 1

## 2020-10-12 ASSESSMENT — EXTERNAL EXAM - RIGHT EYE: OD_EXAM: NORMAL

## 2020-10-12 ASSESSMENT — EXTERNAL EXAM - LEFT EYE: OS_EXAM: NORMAL

## 2020-10-12 NOTE — PROGRESS NOTES
CC - Diabetic eye exam    INTERVAL HISTORY -   VA stable, last A1c 6.3 per patient 9/2020   Has ALETHEA using artificial tears 2/day  DM II on meds      HPI -   Jody Cooper is a 65-year-old woman with h/o of diabetes and lattice.  Also h/o amblyopia OS  Diabetes mellitus II  Sees Dr. Carmela Lemus for DM    PAST OCULAR SURGERY  LASIK OU  ~ 2000  CE/IOL OU 2014  laser retinopexy left eye 10/2009  YAG OS 2015    RETINAL IMAGING  OCT mac 10-12-20  OD - retina normal, PVD, stable  OS - mild outer atrophy, mild staphyloma  peripapillary atrophy    ASSESSMENT & PLAN  1. DMII since ~ 2013   -  Good BG control, No Diabetic retinopathy   - Continue good blood sugar/BP control   - Follow up 1 year    2. Lattice degeneration OU   - left eye s/p laser retinopexy for atrophic holes 2009   -Retinal detachment  Precautions discussed    3. ?myopic degen OS   - amblyopic, has staphyloma & atrophy    3. Amblyopia left eye    - ?refrractive, ?prior high myoopia   -Observe    4. S/p laser in situ keratomileusis (LASIK) both eyes     5. ALETHEA and allergic Sx   - artificial tears and patanol Rx refilled   - d/w patient PFATs        7.  HA 2017   - GCA labs OK 2017, not likely GCA      Follow up 1 year with OCT Mac     ATTESTATION     Attending Physician Attestation:      Complete documentation of historical and exam elements from today's encounter can be found in the full encounter summary report (not reduplicated in this progress note).  I personally obtained the chief complaint(s) and history of present illness.  I confirmed and edited as necessary the review of systems, past medical/surgical history, family history, social history, and examination findings as documented by others; and I examined the patient myself.  I personally reviewed the relevant tests, images, and reports as documented above.  I formulated and edited as necessary the assessment and plan and discussed the findings and management plan with the patient and  family    Odessa De Jesus MD, PhD  , Vitreoretinal Surgery  Department of Ophthalmology  [facility]

## 2020-12-02 DIAGNOSIS — H04.123 DRY EYE SYNDROME OF BOTH EYES: Primary | ICD-10-CM

## 2020-12-03 NOTE — TELEPHONE ENCOUNTER
Medication: artifical tears (AKWA tears) ointment ophthalmic solution     Requested directions: at bedtime      Last Written Prescription Date:  n/a  Last Fill Quantity: n/a   # refills: n/a    Last Office Visit: 10/12/20  Future Office visit: 10/19/21    Attending Provider:Odessa De Jesus MD      Last Clinic Note: 10/12/20    Routing refill request to provider for review/approval because:  Not on medication list  Incomplete order -  Amount, frequency and site (eye) right, left or both.

## 2020-12-15 DIAGNOSIS — H10.45 OTHER CHRONIC ALLERGIC CONJUNCTIVITIS OF BOTH EYES: ICD-10-CM

## 2020-12-16 RX ORDER — OLOPATADINE HYDROCHLORIDE 1 MG/ML
1 SOLUTION/ DROPS OPHTHALMIC 2 TIMES DAILY
Qty: 5 ML | Refills: 8 | Status: SHIPPED | OUTPATIENT
Start: 2020-12-16 | End: 2021-05-14

## 2020-12-16 NOTE — TELEPHONE ENCOUNTER
Medication: olopatadine (PATANOL) 0.1 % ophthalmic solution    Diagnosis: Other chronic allergic conjunctivitis of both eyes   Requested directions: same  Current directions on the medication list: Place 1 drop into both eyes 2 times daily     Last Written Prescription Date:  9/25/20  Last Fill Quantity: 1 bottle ,   # refills: 0    Last Office Visit: 10/12/2020    Future Office visit: 10/19/21    Attending Provider: Odessa De Jesus MD   Last Clinic Note: 10/12/2020  Follow up 1 year with OCT Mac     Refill to pharmacy.

## 2021-02-08 DIAGNOSIS — H04.123 DRY EYE SYNDROME OF BOTH EYES: ICD-10-CM

## 2021-02-08 NOTE — TELEPHONE ENCOUNTER
Call to pharmacy after receiving paper refill request, message left of prescription having been sent 12/3/20 5 g with 11 refills, no further authorization should be needed at this time

## 2021-05-14 DIAGNOSIS — H10.45 OTHER CHRONIC ALLERGIC CONJUNCTIVITIS OF BOTH EYES: ICD-10-CM

## 2021-05-14 RX ORDER — OLOPATADINE HYDROCHLORIDE 1 MG/ML
SOLUTION/ DROPS OPHTHALMIC
Qty: 5 ML | Refills: 8 | Status: SHIPPED | OUTPATIENT
Start: 2021-05-14 | End: 2021-10-19

## 2021-05-14 NOTE — TELEPHONE ENCOUNTER
" olopatadine (PATANOL) 0.1 % ophthalmic solution  Last Written Prescription Date:  12/16/20  Last Fill Quantity: 5ml,   # refills: 8  Last Office Visit : 10/12/20  Future Office visit:  10/19/21    \" patanol Rx refilled\"      "

## 2021-05-17 ENCOUNTER — TELEPHONE (OUTPATIENT)
Dept: OPHTHALMOLOGY | Facility: CLINIC | Age: 66
End: 2021-05-17

## 2021-05-17 DIAGNOSIS — H04.123 DRY EYE SYNDROME OF BOTH EYES: ICD-10-CM

## 2021-05-18 NOTE — TELEPHONE ENCOUNTER
Rx not covered under resident with pt's insurance    Rx sent under Dr. Liza Quinonez, RN 7:15 AM 05/18/21

## 2021-10-19 ENCOUNTER — OFFICE VISIT (OUTPATIENT)
Dept: OPHTHALMOLOGY | Facility: CLINIC | Age: 66
End: 2021-10-19
Attending: OPHTHALMOLOGY
Payer: MEDICAID

## 2021-10-19 ENCOUNTER — TELEPHONE (OUTPATIENT)
Dept: OPHTHALMOLOGY | Facility: CLINIC | Age: 66
End: 2021-10-19

## 2021-10-19 DIAGNOSIS — E10.9 TYPE 1 DIABETES MELLITUS WITHOUT COMPLICATION (H): Primary | ICD-10-CM

## 2021-10-19 DIAGNOSIS — Z96.1 PSEUDOPHAKIA: ICD-10-CM

## 2021-10-19 DIAGNOSIS — H04.123 TEAR FILM INSUFFICIENCY, BILATERAL: ICD-10-CM

## 2021-10-19 DIAGNOSIS — E10.9 TYPE 1 DIABETES MELLITUS WITHOUT COMPLICATION (H): ICD-10-CM

## 2021-10-19 DIAGNOSIS — H10.45 OTHER CHRONIC ALLERGIC CONJUNCTIVITIS OF BOTH EYES: ICD-10-CM

## 2021-10-19 PROCEDURE — 92015 DETERMINE REFRACTIVE STATE: CPT

## 2021-10-19 PROCEDURE — 92134 CPTRZ OPH DX IMG PST SGM RTA: CPT | Performed by: OPHTHALMOLOGY

## 2021-10-19 PROCEDURE — 92014 COMPRE OPH EXAM EST PT 1/>: CPT | Performed by: OPHTHALMOLOGY

## 2021-10-19 PROCEDURE — G0463 HOSPITAL OUTPT CLINIC VISIT: HCPCS

## 2021-10-19 RX ORDER — DIPHENHYDRAMINE HCL 25 MG
1 CAPSULE ORAL 4 TIMES DAILY PRN
Qty: 15 ML | Refills: 11 | Status: SHIPPED | OUTPATIENT
Start: 2021-10-19

## 2021-10-19 RX ORDER — CARBOXYMETHYLCELLULOSE SODIUM 5 MG/ML
1 SOLUTION/ DROPS OPHTHALMIC 4 TIMES DAILY
Qty: 30 ML | Refills: 11 | Status: SHIPPED | OUTPATIENT
Start: 2021-10-19 | End: 2022-10-28

## 2021-10-19 RX ORDER — OLOPATADINE HYDROCHLORIDE 1 MG/ML
SOLUTION/ DROPS OPHTHALMIC
Qty: 5 ML | Refills: 8 | Status: SHIPPED | OUTPATIENT
Start: 2021-10-19 | End: 2022-10-28

## 2021-10-19 ASSESSMENT — TONOMETRY
OD_IOP_MMHG: 11
OS_IOP_MMHG: 12
IOP_METHOD: TONOPEN

## 2021-10-19 ASSESSMENT — REFRACTION_MANIFEST
OS_AXIS: 035
OS_ADD: +2.50
OS_AXIS: 035
OD_SPHERE: -1.00
OD_AXIS: 132
OD_ADD: +2.50
OS_SPHERE: -3.50
OD_SPHERE: +1.50
OS_CYLINDER: +1.50
OS_CYLINDER: +1.50
OD_CYLINDER: +0.50
OD_AXIS: 132
OS_SPHERE: +1.00
OD_CYLINDER: +0.50

## 2021-10-19 ASSESSMENT — CONF VISUAL FIELD
OS_NORMAL: 1
OD_NORMAL: 1
METHOD: COUNTING FINGERS

## 2021-10-19 ASSESSMENT — EXTERNAL EXAM - RIGHT EYE: OD_EXAM: NORMAL

## 2021-10-19 ASSESSMENT — VISUAL ACUITY
OD_SC: 20/25
METHOD: SNELLEN - LINEAR
OS_PH_SC: 20/70
OD_SC+: +2
OS_SC: 20/200

## 2021-10-19 ASSESSMENT — REFRACTION_WEARINGRX
OD_AXIS: 105
OS_SPHERE: -2.00
OD_CYLINDER: +0.75
OD_SPHERE: -0.50
OD_ADD: +2.50
OS_ADD: +2.50
OS_CYLINDER: +1.50
OS_AXIS: 015

## 2021-10-19 ASSESSMENT — CUP TO DISC RATIO
OD_RATIO: 0.1
OS_RATIO: 0.0

## 2021-10-19 ASSESSMENT — EXTERNAL EXAM - LEFT EYE: OS_EXAM: NORMAL

## 2021-10-19 NOTE — NURSING NOTE
"Chief Complaints and History of Present Illnesses   Patient presents with     Lattice Degeneration Follow Up     Chief Complaint(s) and History of Present Illness(es)     Lattice Degeneration Follow Up     Laterality: both eyes    Duration: months    Associated symptoms: floaters and blurred vision.  Negative for eye pain    Pain scale: 0/10              Comments     1 year follow up for Lattice degeneration each eye with testing today.   Per patient's , \"She says she feels her vision each eye is more blurry in the last year, especially when watching television. She says she has to get up to see the television a little better. She notes some occasional floaters and flashes, but very seldom. She notes a lot of dryness, itching, and a burning sensation each eye. She says even when she watches television, her eyes are bothersome. She was given Artificial tears, and they do help, but she was told by the pharmacist that she needed an updated prescription for them.\"     Last BS: \"well controlled\"  No results found for: A1C; 6.1 about 3 months ago; pt has appointment scheduled for 11/3/21 for a new A1C.     Kera Metcalf, RAISA 9:46 AM 10/19/2021                  "

## 2021-10-19 NOTE — PROGRESS NOTES
CC - Diabetic eye exam    INTERVAL HISTORY -   No subjective changes, A1c good per patient  DM II on meds      PMH -   Jody Cooper is a 66-year-old woman with h/o of diabetes and lattice.  Also h/o amblyopia OS  Diabetes mellitus II  Sees Dr. Carmela Lemus for DM    PAST OCULAR SURGERY  LASIK OU  ~ 2000  CE/IOL OU 2014  laser pexy OS 10/2009  YAG OS 2015    RETINAL IMAGING  OCT 10-19-21  OD - retina normal, PVD, stable  OS - mild outer atrophy, mild staphyloma  peripapillary atrophy    ASSESSMENT & PLAN  #. DMII since ~ 2013   -  Good BG control, No Diabetic retinopathy   - Continue good blood sugar/BP control   - Follow up 1 year    #. Lattice degeneration OU   - left eye s/p laser retinopexy for atrophic holes 2009   -Retinal detachment  Precautions discussed     #. myopic degen OS   - amblyopic, has staphyloma & atrophy    #. Amblyopia OS    - ?refrractive, ?prior high myoopia   -Observe    #. S/p laser in situ keratomileusis (LASIK) both eyes     #. ALETHEA and allergic Sx   - artificial tears and patanol Rx refilled   - d/w patient PFATs        #  HA 2017   - GCA labs OK 2017, not likely GCA      Follow up 1 year with OCT Mac     ATTESTATION     Attending Physician Attestation:      Complete documentation of historical and exam elements from today's encounter can be found in the full encounter summary report (not reduplicated in this progress note).  I personally obtained the chief complaint(s) and history of present illness.  I confirmed and edited as necessary the review of systems, past medical/surgical history, family history, social history, and examination findings as documented by others; and I examined the patient myself.  I personally reviewed the relevant tests, images, and reports as documented above.  I formulated and edited as necessary the assessment and plan and discussed the findings and management plan with the patient and family    Odessa De Jesus MD, PhD  Associate  Professor, Vitreoretinal Surgery  Department of Ophthalmology  [facility]

## 2022-10-12 DIAGNOSIS — E10.9 TYPE 1 DIABETES MELLITUS WITHOUT COMPLICATION (H): Primary | ICD-10-CM

## 2022-10-28 ENCOUNTER — OFFICE VISIT (OUTPATIENT)
Dept: OPHTHALMOLOGY | Facility: CLINIC | Age: 67
End: 2022-10-28
Attending: OPHTHALMOLOGY
Payer: MEDICAID

## 2022-10-28 DIAGNOSIS — H10.45 OTHER CHRONIC ALLERGIC CONJUNCTIVITIS OF BOTH EYES: ICD-10-CM

## 2022-10-28 DIAGNOSIS — E10.9 TYPE 1 DIABETES MELLITUS WITHOUT COMPLICATION (H): ICD-10-CM

## 2022-10-28 DIAGNOSIS — Z96.1 PSEUDOPHAKIA: ICD-10-CM

## 2022-10-28 DIAGNOSIS — H04.123 TEAR FILM INSUFFICIENCY, BILATERAL: Primary | ICD-10-CM

## 2022-10-28 PROCEDURE — G0463 HOSPITAL OUTPT CLINIC VISIT: HCPCS | Mod: 25

## 2022-10-28 PROCEDURE — 92134 CPTRZ OPH DX IMG PST SGM RTA: CPT | Performed by: OPHTHALMOLOGY

## 2022-10-28 PROCEDURE — 99213 OFFICE O/P EST LOW 20 MIN: CPT | Performed by: OPHTHALMOLOGY

## 2022-10-28 RX ORDER — CARBOXYMETHYLCELLULOSE SODIUM 5 MG/ML
1 SOLUTION/ DROPS OPHTHALMIC 4 TIMES DAILY
Qty: 30 ML | Refills: 11 | Status: SHIPPED | OUTPATIENT
Start: 2022-10-28 | End: 2023-10-30

## 2022-10-28 RX ORDER — OLOPATADINE HYDROCHLORIDE 1 MG/ML
SOLUTION/ DROPS OPHTHALMIC
Qty: 5 ML | Refills: 8 | Status: SHIPPED | OUTPATIENT
Start: 2022-10-28 | End: 2023-10-30

## 2022-10-28 ASSESSMENT — CONF VISUAL FIELD
OD_SUPERIOR_NASAL_RESTRICTION: 0
OD_INFERIOR_NASAL_RESTRICTION: 0
OS_INFERIOR_TEMPORAL_RESTRICTION: 0
OS_NORMAL: 1
OD_NORMAL: 1
OD_SUPERIOR_TEMPORAL_RESTRICTION: 0
OS_INFERIOR_NASAL_RESTRICTION: 0
OS_SUPERIOR_NASAL_RESTRICTION: 0
OD_INFERIOR_TEMPORAL_RESTRICTION: 0
OS_SUPERIOR_TEMPORAL_RESTRICTION: 0

## 2022-10-28 ASSESSMENT — REFRACTION_WEARINGRX
OD_ADD: +2.50
OS_CYLINDER: +1.50
OD_SPHERE: -0.50
OS_ADD: +2.50
OD_AXIS: 105
OS_SPHERE: -2.00
OS_AXIS: 015
OD_CYLINDER: +0.75

## 2022-10-28 ASSESSMENT — CUP TO DISC RATIO
OD_RATIO: 0.1
OS_RATIO: 0.0

## 2022-10-28 ASSESSMENT — REFRACTION_MANIFEST
OS_ADD: +2.50
OD_AXIS: 105
OS_SPHERE: -2.00
OS_AXIS: 015
OD_SPHERE: -1.00
OD_ADD: +2.50
OS_CYLINDER: +1.50
OD_CYLINDER: +0.50

## 2022-10-28 ASSESSMENT — EXTERNAL EXAM - LEFT EYE: OS_EXAM: NORMAL

## 2022-10-28 ASSESSMENT — VISUAL ACUITY
OS_CC: 20/70
CORRECTION_TYPE: GLASSES
METHOD: SNELLEN - LINEAR
OD_CC: 20/20

## 2022-10-28 ASSESSMENT — TONOMETRY
OD_IOP_MMHG: 17
IOP_METHOD: TONOPEN
OS_IOP_MMHG: 17

## 2022-10-28 ASSESSMENT — EXTERNAL EXAM - RIGHT EYE: OD_EXAM: NORMAL

## 2022-10-28 NOTE — PROGRESS NOTES
CC - Diabetic eye exam    INTERVAL HISTORY -   No subjective changes, A1c good per patient, having ALETHEA OS using artificial tears 2/day  DM II on meds      PMH -   Jody Cooper is a 66-year-old woman with h/o of diabetes and lattice.  Also h/o amblyopia OS  Diabetes mellitus II  Sees Dr. Carmeal Lemus for DM    PAST OCULAR SURGERY  LASIK OU  ~ 2000  CE/IOL OU 2014  laser pexy OS 10/2009  YAG OS 2015    RETINAL IMAGING  OCT 10/28/22   OD - retina normal, PVD, stable  OS - mild outer atrophy, mild drusen, mild staphyloma  peripapillary atrophy    ASSESSMENT & PLAN  #. DMII since ~ 2013   -  Good BG control, No Diabetic retinopathy   - Continue good blood sugar/BP control   - Follow up 1 year    #. Lattice degeneration OU   - left eye s/p laser retinopexy for atrophic holes 2009   -Retinal detachment  Precautions discussed       # Dry AMD OU   - new diagnosis 10/2022   - mild drusen OS on OCT 10/2022    #. myopic degen OS   - amblyopic, has staphyloma & atrophy    #. Amblyopia OS    - ?refrractive, ?prior high myoopia   -Observe    #. S/p laser in situ keratomileusis (LASIK) both eyes     #. ALETHEA and allergic Sx   - artificial tears and patanol Rx refilled (10/2022)   - d/w patient PFATs (10/2022)        #  HA 2017   - GCA labs OK 2017, not likely GCA      Follow up 1 year with OCT Mac, DFE OU    ATTESTATION     Attending Physician Attestation:      Complete documentation of historical and exam elements from today's encounter can be found in the full encounter summary report (not reduplicated in this progress note).  I personally obtained the chief complaint(s) and history of present illness.  I confirmed and edited as necessary the review of systems, past medical/surgical history, family history, social history, and examination findings as documented by others; and I examined the patient myself.  I personally reviewed the relevant tests, images, and reports as documented above.  I formulated and  edited as necessary the assessment and plan and discussed the findings and management plan with the patient and family    Odessa De Jesus MD, PhD  , Vitreoretinal Surgery  Department of Ophthalmology  [facility]

## 2022-10-28 NOTE — NURSING NOTE
Chief Complaints and History of Present Illnesses   Patient presents with     Diabetic Eye Exam     Have 2 pair glasses work well  Blood sugar 118 yesterday am didn't check today  Olopatadine bid BE need refill   Lala RICE 10:31 AM October 28, 2022     Art tears bid BE       Chief Complaint(s) and History of Present Illness(es)     Diabetic Eye Exam            Associated symptoms: photophobia and floaters.  Negative for flashes    Diabetes Type: Type 2    Blood Sugars: is controlled    Comments: Have 2 pair glasses work well  Blood sugar 118 yesterday am didn't check today  Olopatadine bid BE need refill   Lala RICE 10:31 AM October 28, 2022     Art tears bid BE

## 2023-10-20 DIAGNOSIS — E10.9 TYPE 1 DIABETES MELLITUS WITHOUT COMPLICATION (H): Primary | ICD-10-CM

## 2023-10-30 ENCOUNTER — OFFICE VISIT (OUTPATIENT)
Dept: OPHTHALMOLOGY | Facility: CLINIC | Age: 68
End: 2023-10-30
Attending: OPHTHALMOLOGY
Payer: MEDICAID

## 2023-10-30 DIAGNOSIS — Z96.1 PSEUDOPHAKIA: ICD-10-CM

## 2023-10-30 DIAGNOSIS — H43.813 PVD (POSTERIOR VITREOUS DETACHMENT), BILATERAL: Primary | ICD-10-CM

## 2023-10-30 DIAGNOSIS — H44.2E2 LEFT EYE AFFECTED BY DEGENERATIVE MYOPIA WITH OTHER MACULOPATHY: ICD-10-CM

## 2023-10-30 DIAGNOSIS — H10.45 OTHER CHRONIC ALLERGIC CONJUNCTIVITIS OF BOTH EYES: ICD-10-CM

## 2023-10-30 DIAGNOSIS — E10.9 TYPE 1 DIABETES MELLITUS WITHOUT COMPLICATION (H): ICD-10-CM

## 2023-10-30 DIAGNOSIS — H04.123 TEAR FILM INSUFFICIENCY, BILATERAL: ICD-10-CM

## 2023-10-30 PROCEDURE — 99213 OFFICE O/P EST LOW 20 MIN: CPT | Mod: GC | Performed by: OPHTHALMOLOGY

## 2023-10-30 PROCEDURE — 92134 CPTRZ OPH DX IMG PST SGM RTA: CPT | Performed by: OPHTHALMOLOGY

## 2023-10-30 PROCEDURE — G0463 HOSPITAL OUTPT CLINIC VISIT: HCPCS | Performed by: OPHTHALMOLOGY

## 2023-10-30 PROCEDURE — 92015 DETERMINE REFRACTIVE STATE: CPT

## 2023-10-30 RX ORDER — OLOPATADINE HYDROCHLORIDE 1 MG/ML
SOLUTION/ DROPS OPHTHALMIC
Qty: 5 ML | Refills: 8 | Status: SHIPPED | OUTPATIENT
Start: 2023-10-30

## 2023-10-30 RX ORDER — CARBOXYMETHYLCELLULOSE SODIUM 5 MG/ML
1 SOLUTION/ DROPS OPHTHALMIC 4 TIMES DAILY
Qty: 30 ML | Refills: 11 | Status: SHIPPED | OUTPATIENT
Start: 2023-10-30

## 2023-10-30 ASSESSMENT — TONOMETRY
OD_IOP_MMHG: 19
IOP_METHOD: TONOPEN
OS_IOP_MMHG: 18

## 2023-10-30 ASSESSMENT — REFRACTION_WEARINGRX
OS_SPHERE: -3.50
OS_AXIS: 035
OD_SPHERE: +1.50
OD_ADD: +2.50
OS_AXIS: 035
OS_SPHERE: +1.00
OS_CYLINDER: +1.50
OD_SPHERE: -1.00
OD_AXIS: 132
OD_AXIS: 132
OS_ADD: +2.50
OD_CYLINDER: +0.50
OS_CYLINDER: +1.50
OD_CYLINDER: +0.50

## 2023-10-30 ASSESSMENT — REFRACTION_MANIFEST
OS_SPHERE: -3.50
OD_CYLINDER: +0.50
OD_ADD: +2.75
OD_AXIS: 125
OD_SPHERE: -0.75
OS_CYLINDER: +1.50
OS_AXIS: 007
OS_ADD: +2.75

## 2023-10-30 ASSESSMENT — VISUAL ACUITY
OD_CC: 20/25
OS_CC: 20/80
METHOD: SNELLEN - LINEAR
CORRECTION_TYPE: GLASSES

## 2023-10-30 ASSESSMENT — CONF VISUAL FIELD
OS_SUPERIOR_TEMPORAL_RESTRICTION: 0
OS_INFERIOR_TEMPORAL_RESTRICTION: 0
OD_NORMAL: 1
OD_SUPERIOR_TEMPORAL_RESTRICTION: 0
OD_SUPERIOR_NASAL_RESTRICTION: 0
OD_INFERIOR_TEMPORAL_RESTRICTION: 0
OD_INFERIOR_NASAL_RESTRICTION: 0
OS_SUPERIOR_NASAL_RESTRICTION: 0
OS_NORMAL: 1
OS_INFERIOR_NASAL_RESTRICTION: 0

## 2023-10-30 ASSESSMENT — CUP TO DISC RATIO
OD_RATIO: 0.1
OS_RATIO: 0.0

## 2023-10-30 ASSESSMENT — EXTERNAL EXAM - RIGHT EYE: OD_EXAM: NORMAL

## 2023-10-30 ASSESSMENT — EXTERNAL EXAM - LEFT EYE: OS_EXAM: NORMAL

## 2023-10-30 NOTE — PROGRESS NOTES
CC - Diabetic eye exam    INTERVAL HISTORY -   Patient reports she has some pain, irritation Patient also reports she has had some visual hallucinations.    feels irritation/stinging in eyes, occasional purple spots in eye  using ATs   A1c: 6.6 per patient. 10/2023      PMH -   Jody Cooper is a 66-year-old woman with h/o of diabetes and lattice.  Also h/o amblyopia OS  Diabetes mellitus II  Sees Dr. Carmela Lemus for DM    PAST OCULAR SURGERY  LASIK OU  ~ 2000  CE/IOL OU 2014  laser pexy OS 10/2009  YAG OS 2015    RETINAL IMAGING  OCT 10/30/2023  OD - retina normal, PVD, stable  OS - mild outer atrophy, mild drusen, mild staphyloma  peripapillary atrophy- stable    ASSESSMENT & PLAN  #. DMII since ~ 2013   -  Good BG control, No Diabetic retinopathy   - Continue good blood sugar/BP control   - Follow up 1 year    #. Lattice degeneration OU   - Left eye s/p laser retinopexy for atrophic holes 2009   - Retinal detachment  Precautions discussed       # Dry AMD OU   - New diagnosis 10/2022   - mild drusen OS on OCT 10/2023 - stable    #. myopic degen OS   - amblyopic, has staphyloma & atrophy    #. Amblyopia OS    - ?refrractive, ?prior high myoopia   - ?small ONH   -Observe    #. S/p laser in situ keratomileusis (LASIK) both eyes     #. ALETHEA and allergic Sx   - artificial tears and patanol Rx refilled (10/2023)   - d/w patient PFATs (10/2023)    #  HA 2017   - GCA labs OK 2017, not likely GCA      Follow up 1 year with OCT Mac, DFE each eye    Juice Galloway MD  PGY-5 Vitreo-retina surgery Fellow  Department of Ophthalmology   [facility]      ATTESTATION     Attending Physician Attestation:      Complete documentation of historical and exam elements from today's encounter can be found in the full encounter summary report (not reduplicated in this progress note).  I personally obtained the chief complaint(s) and history of present illness.  I confirmed and edited as necessary the  review of systems, past medical/surgical history, family history, social history, and examination findings as documented by others; and I examined the patient myself.  I personally reviewed the relevant tests, images, and reports as documented above.  I personally reviewed the ophthalmic test(s) associated with this encounter, agree with the interpretation(s) as documented by the resident/fellow, and have edited the corresponding report(s) as necessary.   I formulated and edited as necessary the assessment and plan and discussed the findings and management plan with the patient and family    Odessa De Jesus MD, PhD  , Vitreoretinal Surgery  Department of Ophthalmology  [facility]

## 2023-10-30 NOTE — NURSING NOTE
Chief Complaints and History of Present Illnesses   Patient presents with    Diabetic Eye Exam     Chief Complaint(s) and History of Present Illness(es)       Diabetic Eye Exam               Comments    Pt. States that she has been having a lot of itching and irritation BE. Has been using some AT's and an allergy eye drop BE with no improvement. No change in VA BE. No flashes or floaters BE.   Anel Gracia COT 9:41 AM October 30, 2023

## 2024-08-14 DIAGNOSIS — E10.9 TYPE 1 DIABETES MELLITUS WITHOUT COMPLICATION (H): Primary | ICD-10-CM

## 2024-09-03 ENCOUNTER — OFFICE VISIT (OUTPATIENT)
Dept: OPHTHALMOLOGY | Facility: CLINIC | Age: 69
End: 2024-09-03
Attending: OPHTHALMOLOGY
Payer: MEDICAID

## 2024-09-03 DIAGNOSIS — H02.889 MEIBOMIAN GLAND DYSFUNCTION: Primary | ICD-10-CM

## 2024-09-03 DIAGNOSIS — E10.9 TYPE 1 DIABETES MELLITUS WITHOUT COMPLICATION (H): ICD-10-CM

## 2024-09-03 PROCEDURE — 99214 OFFICE O/P EST MOD 30 MIN: CPT | Mod: GC | Performed by: OPHTHALMOLOGY

## 2024-09-03 PROCEDURE — T1013 SIGN LANG/ORAL INTERPRETER: HCPCS | Mod: U4

## 2024-09-03 PROCEDURE — G0463 HOSPITAL OUTPT CLINIC VISIT: HCPCS | Performed by: OPHTHALMOLOGY

## 2024-09-03 PROCEDURE — 92015 DETERMINE REFRACTIVE STATE: CPT

## 2024-09-03 PROCEDURE — 92134 CPTRZ OPH DX IMG PST SGM RTA: CPT | Performed by: OPHTHALMOLOGY

## 2024-09-03 RX ORDER — GABAPENTIN 100 MG/1
CAPSULE ORAL
COMMUNITY
Start: 2024-05-28

## 2024-09-03 RX ORDER — PREGABALIN 25 MG/1
25 CAPSULE ORAL
COMMUNITY
Start: 2024-07-09

## 2024-09-03 ASSESSMENT — REFRACTION_MANIFEST
OS_ADD: +2.50
OS_SPHERE: -4.00
OS_CYLINDER: +1.00
OD_CYLINDER: SPHERE
OD_SPHERE: -0.50
OS_AXIS: 047
OD_ADD: +2.50

## 2024-09-03 ASSESSMENT — CONF VISUAL FIELD
OD_INFERIOR_TEMPORAL_RESTRICTION: 0
OS_SUPERIOR_NASAL_RESTRICTION: 0
OS_NORMAL: 1
OD_SUPERIOR_TEMPORAL_RESTRICTION: 0
OD_NORMAL: 1
OS_INFERIOR_TEMPORAL_RESTRICTION: 0
OD_INFERIOR_NASAL_RESTRICTION: 0
OD_SUPERIOR_NASAL_RESTRICTION: 0
OS_SUPERIOR_TEMPORAL_RESTRICTION: 0
OS_INFERIOR_NASAL_RESTRICTION: 0
METHOD: COUNTING FINGERS

## 2024-09-03 ASSESSMENT — VISUAL ACUITY
OD_CC: 20/30
CORRECTION_TYPE: GLASSES
OS_CC+: -1
METHOD: SNELLEN - LINEAR
OS_CC: 20/125
METHOD_MR: PT REQUESTS REFRACTION.
OD_CC+: -2

## 2024-09-03 ASSESSMENT — REFRACTION_WEARINGRX
OD_ADD: +2.50
OS_CYLINDER: +1.50
OD_AXIS: 132
OS_AXIS: 035
OD_SPHERE: -1.00
OS_ADD: +2.50
OD_CYLINDER: +0.50
OS_SPHERE: -3.50

## 2024-09-03 ASSESSMENT — TONOMETRY
OD_IOP_MMHG: 14
OS_IOP_MMHG: 14
IOP_METHOD: TONOPEN

## 2024-09-03 ASSESSMENT — CUP TO DISC RATIO
OS_RATIO: 0.1
OD_RATIO: 0.1

## 2024-09-03 ASSESSMENT — EXTERNAL EXAM - LEFT EYE: OS_EXAM: NORMAL

## 2024-09-03 ASSESSMENT — EXTERNAL EXAM - RIGHT EYE: OD_EXAM: NORMAL
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
## 2024-09-03 NOTE — NURSING NOTE
Chief Complaints and History of Present Illnesses   Patient presents with    Follow Up     Diabetic eye exam     Chief Complaint(s) and History of Present Illness(es)       Follow Up              Laterality: both eyes    Course: stable    Associated symptoms: dryness, flashes (infrequent, sees green light), floaters, itching and foreign body sensation.  Negative for eye pain    Treatments tried: eye drops and artificial tears    Pain scale: 0/10    Comments: Diabetic eye exam              Comments    Exam interpreted over Ipad ID #.  She states that her vision in the right eye is like looking through a spider web.  Both eyes feel dry and itchy.    She uses Olopatadine and Refresh drops twice a day in each eye.    Component           07/09/24  04/16/24 12/29/23 10/18/23 07/18/23 04/14/23  Hemoglobin A1C (Rapid) 6.8 High  - - 6.7 High  6.3 High  6.1 High   (A1c readings copied from patient's Care Everywhere Chart)    RAISA Roca 12:43 PM  September 3, 2024

## 2025-01-16 DIAGNOSIS — H10.45 OTHER CHRONIC ALLERGIC CONJUNCTIVITIS OF BOTH EYES: ICD-10-CM

## 2025-01-16 RX ORDER — OLOPATADINE HYDROCHLORIDE 1 MG/ML
SOLUTION/ DROPS OPHTHALMIC
Qty: 5 ML | Refills: 8 | Status: SHIPPED | OUTPATIENT
Start: 2025-01-16

## 2025-08-21 DIAGNOSIS — H35.413 BILATERAL RETINAL LATTICE DEGENERATION: Primary | ICD-10-CM

## 2025-09-02 ENCOUNTER — OFFICE VISIT (OUTPATIENT)
Dept: OPHTHALMOLOGY | Facility: CLINIC | Age: 70
End: 2025-09-02
Attending: OPHTHALMOLOGY
Payer: COMMERCIAL

## 2025-09-02 DIAGNOSIS — H04.123 DRY EYE SYNDROME OF BOTH EYES: ICD-10-CM

## 2025-09-02 DIAGNOSIS — E10.9 TYPE 1 DIABETES MELLITUS WITHOUT COMPLICATION (H): Primary | ICD-10-CM

## 2025-09-02 DIAGNOSIS — H35.413 BILATERAL RETINAL LATTICE DEGENERATION: ICD-10-CM

## 2025-09-02 PROCEDURE — 92134 CPTRZ OPH DX IMG PST SGM RTA: CPT | Performed by: OPHTHALMOLOGY

## 2025-09-02 PROCEDURE — 92134 CPTRZ OPH DX IMG PST SGM RTA: CPT | Mod: 26 | Performed by: OPHTHALMOLOGY

## 2025-09-02 PROCEDURE — 99214 OFFICE O/P EST MOD 30 MIN: CPT | Mod: GC | Performed by: OPHTHALMOLOGY

## 2025-09-02 PROCEDURE — G0463 HOSPITAL OUTPT CLINIC VISIT: HCPCS | Performed by: OPHTHALMOLOGY

## 2025-09-02 ASSESSMENT — REFRACTION_MANIFEST
OS_SPHERE: -4.25
OS_AXIS: 045
OS_SPHERE: -4.00
OD_AXIS: 170
OD_CYLINDER: +1.75
OD_CYLINDER: +1.00
OD_ADD: +2.75
OS_AXIS: 060
METHOD_AUTOREFRACTION: 1
OS_CYLINDER: +1.75
OD_SPHERE: -1.00
OS_CYLINDER: +1.25
OD_SPHERE: -2.50
OS_ADD: +2.75
OD_AXIS: 131

## 2025-09-02 ASSESSMENT — CONF VISUAL FIELD
OD_NORMAL: 1
OS_INFERIOR_TEMPORAL_RESTRICTION: 0
OS_SUPERIOR_TEMPORAL_RESTRICTION: 0
OD_INFERIOR_TEMPORAL_RESTRICTION: 0
OD_SUPERIOR_TEMPORAL_RESTRICTION: 0
OD_SUPERIOR_NASAL_RESTRICTION: 0
OS_NORMAL: 1
OS_SUPERIOR_NASAL_RESTRICTION: 0
OD_INFERIOR_NASAL_RESTRICTION: 0
METHOD: COUNTING FINGERS
OS_INFERIOR_NASAL_RESTRICTION: 0

## 2025-09-02 ASSESSMENT — CUP TO DISC RATIO
OS_RATIO: 0.1
OD_RATIO: 0.1

## 2025-09-02 ASSESSMENT — VISUAL ACUITY
OS_PH_SC: 20/100
OD_PH_SC+: -2
METHOD: SNELLEN - LINEAR
OD_SC+: -2
OD_PH_SC: 20/25
OD_SC: 20/40
OS_SC: 20/150

## 2025-09-02 ASSESSMENT — EXTERNAL EXAM - LEFT EYE: OS_EXAM: NORMAL

## 2025-09-02 ASSESSMENT — TONOMETRY
OD_IOP_MMHG: 14
IOP_METHOD: TONOPEN
OS_IOP_MMHG: 17

## 2025-09-02 ASSESSMENT — EXTERNAL EXAM - RIGHT EYE: OD_EXAM: NORMAL
